# Patient Record
Sex: MALE | URBAN - METROPOLITAN AREA
[De-identification: names, ages, dates, MRNs, and addresses within clinical notes are randomized per-mention and may not be internally consistent; named-entity substitution may affect disease eponyms.]

---

## 2018-10-23 ENCOUNTER — OFFICE VISIT (OUTPATIENT)
Dept: URBAN - METROPOLITAN AREA CLINIC 23 | Facility: CLINIC | Age: 73
End: 2018-10-23
Payer: COMMERCIAL

## 2018-10-23 DIAGNOSIS — H57.11 OCULAR PAIN, RIGHT EYE: ICD-10-CM

## 2018-10-23 DIAGNOSIS — H35.3231 BILATERAL EXUDATIVE AGE-RELATED MACULAR DEGENERATION W/ ACTIVE CHOROIDAL NEOVASCULARIZATION: Primary | ICD-10-CM

## 2018-10-23 PROCEDURE — 92134 CPTRZ OPH DX IMG PST SGM RTA: CPT | Performed by: OPTOMETRIST

## 2018-10-23 PROCEDURE — 92014 COMPRE OPH EXAM EST PT 1/>: CPT | Performed by: OPTOMETRIST

## 2018-10-23 ASSESSMENT — INTRAOCULAR PRESSURE
OD: 18
OS: 16

## 2018-10-23 NOTE — IMPRESSION/PLAN
Impression: Bilateral exudative age-related macular degeneration w/ active choroidal neovascularization: H35.3231. Plan: Discussed diagnosis in detail with patient. Discussed risks and benefits and patient understands. Advised patient of condition. Reassured patient of current condition and treatment. Will continue to observe condition and or symptoms. Recommend consultation with Dr. Nigel Hamlin regarding macular edema OU.

## 2018-10-23 NOTE — IMPRESSION/PLAN
Impression: Ocular pain, right eye: H57.11. Plan: Advised patient of condition. Reassured patient of current condition and treatment. Will continue to observe condition and or symptoms. Consult with Dr. Susan Keyes regarding ocular pain OD. Advised patient that there is no pathology suggesting that pain is related to his previous cataract surgery.

## 2018-11-02 ENCOUNTER — OFFICE VISIT (OUTPATIENT)
Dept: URBAN - METROPOLITAN AREA CLINIC 23 | Facility: CLINIC | Age: 73
End: 2018-11-02
Payer: COMMERCIAL

## 2018-11-02 DIAGNOSIS — H35.353 CYSTOID MACULAR DEGENERATION, BILATERAL: Primary | ICD-10-CM

## 2018-11-02 PROCEDURE — 92134 CPTRZ OPH DX IMG PST SGM RTA: CPT | Performed by: OPHTHALMOLOGY

## 2018-11-02 PROCEDURE — 92014 COMPRE OPH EXAM EST PT 1/>: CPT | Performed by: OPHTHALMOLOGY

## 2018-11-02 RX ORDER — KETOROLAC TROMETHAMINE 5 MG/ML
0.5 % SOLUTION OPHTHALMIC
Qty: 11 | Refills: 4 | Status: ACTIVE
Start: 2018-11-02

## 2018-11-02 ASSESSMENT — INTRAOCULAR PRESSURE
OD: 10
OS: 9

## 2018-11-02 NOTE — IMPRESSION/PLAN
Impression: Cystoid macular degeneration, bilateral: H35.353. OU. Plan: Discussed diagnosis in detail with patient. Exam shows no active edema, no fluid or heme OU however OCT shows RPE changes with SRF with PED ? SRF OD, OS SRF with PED changes. Recommend Prolensa OU QD or Ketorolac OU QID to help reduce the fluid seen in OCT not showing in the exam OU. Will reassess condition in 1 month. Patient states he feels a sharp pain in the right eye and eye gets red from time to time. Recommend to use artificial tears OU QID for comfort.

## 2018-12-06 ENCOUNTER — OFFICE VISIT (OUTPATIENT)
Dept: URBAN - METROPOLITAN AREA CLINIC 23 | Facility: CLINIC | Age: 73
End: 2018-12-06
Payer: COMMERCIAL

## 2018-12-06 DIAGNOSIS — H35.352 CYSTOID MACULAR DEGENERATION, LEFT EYE: ICD-10-CM

## 2018-12-06 DIAGNOSIS — H35.3211 EXDTVE AGE-REL MCLR DEGN, RIGHT EYE, WITH ACTV CHRDL NEOVAS: Primary | ICD-10-CM

## 2018-12-06 PROCEDURE — 92134 CPTRZ OPH DX IMG PST SGM RTA: CPT | Performed by: OPHTHALMOLOGY

## 2018-12-06 PROCEDURE — 99213 OFFICE O/P EST LOW 20 MIN: CPT | Performed by: OPHTHALMOLOGY

## 2018-12-06 ASSESSMENT — INTRAOCULAR PRESSURE
OD: 12
OS: 10

## 2018-12-06 NOTE — IMPRESSION/PLAN
Impression: Exdtve age-rel mclr degn, right eye, with actv chrdl neovas: H35.3211. OD. Condition: unstable. Vision: vision affected. Plan: Discussed diagnosis in detail with patient. Exam shows no active edema, no fluid or heme OU however OCT shows RPE changes, PED with SRF OD. Discussed risks of progression with present condition. Based on findings recommend Intravitreal Injection Treatment to help reduce the fluid and prevent a further reduction in vision, as there has not been any improvement with the drops. Discussed the risks and benefits of tx. All questions answered. Patient elects to proceed with recommendation.  OCT shows RPE changes, PED with SRF OD

## 2018-12-06 NOTE — IMPRESSION/PLAN
Impression: Cystoid macular degeneration, left eye: H35.352. OS. Condition: stable. Vision: vision affected. Plan: Discussed diagnosis in detail with patient. Exam shows no active edema, no fluid or heme OU however OCT shows OS SRF with PED changes. Recommend Prolensa OS QD or Ketorolac OS QID to help reduce the fluid seen in OCT not showing in the exam OU. Will reassess condition in 1 month.